# Patient Record
Sex: MALE | Race: WHITE
[De-identification: names, ages, dates, MRNs, and addresses within clinical notes are randomized per-mention and may not be internally consistent; named-entity substitution may affect disease eponyms.]

---

## 2019-01-11 ENCOUNTER — HOSPITAL ENCOUNTER (OUTPATIENT)
Dept: HOSPITAL 80 - FIMAGING | Age: 54
End: 2019-01-11
Attending: NURSE PRACTITIONER
Payer: MEDICAID

## 2019-01-11 DIAGNOSIS — M19.041: Primary | ICD-10-CM

## 2019-02-08 ENCOUNTER — HOSPITAL ENCOUNTER (OUTPATIENT)
Dept: HOSPITAL 80 - FSGY | Age: 54
Discharge: HOME | End: 2019-02-08
Attending: SURGERY
Payer: MEDICAID

## 2019-02-08 VITALS — DIASTOLIC BLOOD PRESSURE: 81 MMHG | SYSTOLIC BLOOD PRESSURE: 125 MMHG

## 2019-02-08 DIAGNOSIS — C85.90: ICD-10-CM

## 2019-02-08 DIAGNOSIS — A63.0: Primary | ICD-10-CM

## 2019-02-08 DIAGNOSIS — Z92.21: ICD-10-CM

## 2019-02-08 NOTE — PDANEPAE
CARLIN Past Medical History





- Cardiovascular History


Hx Hypertension: No


Hx Arrhythmias: No


Hx Chest Pain: No


Hx Coronary Artery / Peripheral Vascular Disease: No


Hx CHF / Valvular Disease: No


Hx Palpitations: No





- Pulmonary History


Hx COPD: No


Hx Asthma/Reactive Airway Disease: No


Hx Recent Upper Respiratory Infection: No


Hx Oxygen in Use at Home: No


Hx Sleep Apnea: No


Sleep Apnea Screening Result - Last Documented: Positive


Pulmonary History Comment: 2/15-URI, resolved.





- Neurologic History


Hx Cerebrovascular Accident: No


Hx Seizures: Yes


Hx Dementia: No


Neurologic History Comment: seizure disorder.  none for over the last year





- Endocrine History


Hx Diabetes: No





- Renal History


Hx Renal Disorders: No





- Liver History


Hx Hepatic Disorders: No


Hepatic History Comment: cyst on liver.





- Neurological & Psychiatric Hx


Hx Neurological and Psychiatric Disorders: No





- Cancer History


Hx Cancer: Yes


Cancer History Comment: New onset of lymphoma, now in remission





- Congenital Disorder History


Hx Congenital Disorders: No





- GI History


Hx Gastrointestinal Disorders: Yes


Gastrointestinal History Comment: acid reflux





- Other Health History


Other Health History: Anal warts.  mildly [place] [employer] [place]





- Chronic Pain History


Chronic Pain: Yes (R THUMB)





- Surgical History


Prior Surgeries: 1992-R knee scope. 1994-L knee scope. 1996-L shoulder scope.  

couple of procedure with Dr jese GILLIS Review of Systems


Review of Systems: 








- Exercise capacity


METS (RN): 4 METS





CARLIN Patient History





- Allergies


Allergies/Adverse Reactions: 








No Known Allergies Allergy (Unverified 02/01/19 15:18)


 








- Home Medications


Home Medications: 








Gabapentin [Neurontin 300 MG (*)] 600 mg 12/15/15 [Last Taken 02/08/19 08:00]


Aspirin  02/01/19 [Last Taken 01/30/19]


Esomeprazole Magnesium  02/01/19 [Last Taken 02/08/19 08:00]


Lorazepam  02/01/19 [Last Taken 01/21/19]


Multivitamin  02/01/19 [Last Taken 02/08/19 08:00]


Vimpat  02/01/19 [Last Taken 02/08/19 08:00]


Loratadine/Pseudoephedrine [Claritin-D 24 Hour Tablet] 1 each PO DAILY 02/08/19 

[Last Taken 02/08/19 08:00]








- NPO status


NPO Since - Liquids (Date): 02/08/19


NPO Since - Liquids (Time): 08:00


NPO Since - Solids (Date): 02/07/19


NPO Since - Solids (Time): 23:50





- Smoking Hx


Smoking Status: Never smoked





- Family Anes Hx


Family Hx Anesthesia Complications: none





ANE Labs/Vital Signs





- Vital Signs


Blood Pressure: 128/102


Heart Rate: 76


Respiratory Rate: 15


O2 Sat (%): 92


Height: 182.88 cm


Weight: 136.078 kg





ANE Physical Exam





- Airway


Neck exam: FROM


Mallampati Score: Class 3


Mouth exam: normal dental/mouth exam





- Pulmonary


Pulmonary: no respiratory distress, no rales or rhonchi, reduced air movement





- Cardiovascular


Cardiovascular: regular rate and rhythym, no murmur, rub, or gallop





- ASA Status


ASA Status: III





ANE Anesthesia Plan


Anesthesia Plan: GA w LMA

## 2019-02-08 NOTE — GOP
[f 
rep st]



                                                                OPERATIVE REPORT





DATE OF OPERATION:  02/08/2019



SURGEON:  Petros Santacruz MD



ANESTHESIA:  General.



ANESTHESIOLOGIST:  Carolina Lamb MD.



PREOPERATIVE DIAGNOSIS:  Perineal condyloma.



POSTOPERATIVE DIAGNOSIS:  Perineal condyloma.



PROCEDURE PERFORMED:  Excision/fulguration of multiple perineal condylomata.



FINDINGS:  See below.



INDICATIONS:  53-year-old male with a significant history for lymphoma, as well 
as anal condylomatous disease.  He has developed a large bulky recurrence for 
which he is undergoing surgical excision at this time.  Risks and benefits were 
explained of bleeding, infection, and recurrence, as well as ongoing wound 
management issues.  All questions were answered.  He desires to proceed.



DESCRIPTION OF PROCEDURE:  After general anesthesia was induced, the patient 
was placed in candy-cane stirrups.  Visual perineal exam disclosed multiple 
fungating sessile condylomata scattered throughout the perineum from beneath 
the scrotum to just anterior to the anal verge.  Digital rectal exam was 
unremarkable.  Anoscopy showed no intrarectal lesions.  Lesions ranged in size 
from 1 cm to approximately 3 cm.  Local anesthetic was infiltrated throughout 
all areas of concern.  Using electrocautery, the lesions were all completely 
excised to healthy-appearing subcutaneous tissues.  Each wound bed was 
fulgurated using electrocautery.  No further lesions were present upon 
completion.  Satisfactory hemostasis was assured.  Xeroform gauze was applied.  
The patient was taken the recovery room awake uneventfully.





Job #:  [id number]

MTDD

## 2019-02-08 NOTE — POSTOPPROG
Post Op Note


Date of Operation: 02/08/19


Surgeon: Petros Santacruz


Anesthesiologist: Carolina Lamb


Anesthesia: GET(General Endotracheal)


Pre-op Diagnosis: Perineal condyloma


Post-op Diagnosis: Same


Procedure: Excision/fulguration of multiple perineal condyoma


Findings: multiple fungating condyloma.  normal anal exam


Inf/Abcess present in the surg proc area at time of surgery?: No


EBL: Minimal


Specimen(s): 





condylomata

## 2022-01-21 PROBLEM — Z00.00 ANNUAL PHYSICAL EXAM: Status: ACTIVE | Noted: 2022-01-21

## 2022-01-21 PROBLEM — K52.9 CHRONIC DIARRHEA: Status: ACTIVE | Noted: 2022-01-21

## 2022-01-21 PROBLEM — M25.561 CHRONIC PAIN OF BOTH KNEES: Status: ACTIVE | Noted: 2022-01-21

## 2022-01-21 PROBLEM — Z12.5 SCREENING FOR MALIGNANT NEOPLASM OF PROSTATE: Status: ACTIVE | Noted: 2022-01-21

## 2022-01-21 PROBLEM — G89.29 CHRONIC PAIN OF BOTH KNEES: Status: ACTIVE | Noted: 2022-01-21

## 2022-01-21 PROBLEM — Z12.11 SCREEN FOR COLON CANCER: Status: ACTIVE | Noted: 2022-01-21

## 2022-01-21 PROBLEM — R53.82 CHRONIC FATIGUE: Status: ACTIVE | Noted: 2022-01-21

## 2022-01-21 PROBLEM — M25.562 CHRONIC PAIN OF BOTH KNEES: Status: ACTIVE | Noted: 2022-01-21

## 2022-01-21 PROBLEM — F41.0 PANIC ATTACKS: Status: ACTIVE | Noted: 2022-01-21

## 2022-01-28 PROBLEM — R73.9 HYPERGLYCEMIA: Status: ACTIVE | Noted: 2022-01-28

## 2022-01-28 PROBLEM — M17.0 PRIMARY OSTEOARTHRITIS OF BOTH KNEES: Status: ACTIVE | Noted: 2022-01-21

## 2022-01-28 PROBLEM — Z13.1 SCREENING FOR DIABETES MELLITUS: Status: ACTIVE | Noted: 2022-01-28

## 2022-01-28 PROBLEM — R94.5 ABNORMAL LIVER FUNCTION: Status: ACTIVE | Noted: 2022-01-28

## 2022-01-28 PROBLEM — Z00.00 PREVENTATIVE HEALTH CARE: Status: ACTIVE | Noted: 2022-01-28

## 2022-02-22 DIAGNOSIS — D84.9 IMMUNOSUPPRESSED STATUS: ICD-10-CM

## 2022-03-03 PROBLEM — E66.9 OBESITY: Status: ACTIVE | Noted: 2022-03-03

## 2022-04-25 PROBLEM — Z00.00 ANNUAL PHYSICAL EXAM: Status: RESOLVED | Noted: 2022-01-21 | Resolved: 2022-04-25

## 2022-05-02 PROBLEM — Z00.00 PREVENTATIVE HEALTH CARE: Status: RESOLVED | Noted: 2022-01-28 | Resolved: 2022-05-02

## 2022-05-02 PROBLEM — Z13.1 SCREENING FOR DIABETES MELLITUS: Status: RESOLVED | Noted: 2022-01-28 | Resolved: 2022-05-02

## 2022-05-26 PROBLEM — S62.109A CLOSED FRACTURE OF WRIST: Status: ACTIVE | Noted: 2022-05-26

## 2022-06-02 PROBLEM — G47.31 CENTRAL SLEEP APNEA: Status: ACTIVE | Noted: 2022-06-02

## 2022-06-02 PROBLEM — G47.30 SLEEP-DISORDERED BREATHING: Status: ACTIVE | Noted: 2022-06-02

## 2022-11-09 PROBLEM — C83.07 SPLENIC MARGINAL ZONE B-CELL LYMPHOMA: Status: ACTIVE | Noted: 2022-11-09

## 2022-11-09 PROBLEM — Z85.72 ENCOUNTER FOR FOLLOW-UP SURVEILLANCE OF LYMPHOMA: Status: ACTIVE | Noted: 2022-11-09

## 2022-11-09 PROBLEM — Z08 ENCOUNTER FOR FOLLOW-UP SURVEILLANCE OF LYMPHOMA: Status: ACTIVE | Noted: 2022-11-09

## 2023-02-03 PROBLEM — M23.307 DEGENERATION OF MENISCUS OF LEFT KNEE: Status: ACTIVE | Noted: 2023-02-03

## 2023-02-04 PROBLEM — M17.12 PRIMARY OSTEOARTHRITIS OF LEFT KNEE: Status: ACTIVE | Noted: 2023-02-04

## 2023-02-04 PROBLEM — M71.22 BAKER'S CYST OF KNEE, LEFT: Status: ACTIVE | Noted: 2023-02-04

## 2023-02-04 PROBLEM — S83.512A LEFT ANTERIOR CRUCIATE LIGAMENT TEAR: Status: ACTIVE | Noted: 2023-02-04

## 2023-02-13 PROBLEM — Z85.72 ENCOUNTER FOR FOLLOW-UP SURVEILLANCE OF LYMPHOMA: Status: RESOLVED | Noted: 2022-11-09 | Resolved: 2023-02-13

## 2023-02-13 PROBLEM — Z08 ENCOUNTER FOR FOLLOW-UP SURVEILLANCE OF LYMPHOMA: Status: RESOLVED | Noted: 2022-11-09 | Resolved: 2023-02-13

## 2023-02-15 PROBLEM — E78.00 PURE HYPERCHOLESTEROLEMIA: Status: ACTIVE | Noted: 2023-02-15

## 2023-03-24 ENCOUNTER — TELEPHONE (OUTPATIENT)
Dept: ADMINISTRATIVE | Facility: HOSPITAL | Age: 58
End: 2023-03-24

## 2023-03-24 VITALS — SYSTOLIC BLOOD PRESSURE: 134 MMHG | DIASTOLIC BLOOD PRESSURE: 83 MMHG

## 2023-07-11 PROBLEM — M15.9 PRIMARY OSTEOARTHRITIS INVOLVING MULTIPLE JOINTS: Status: ACTIVE | Noted: 2023-07-11
